# Patient Record
Sex: MALE
[De-identification: names, ages, dates, MRNs, and addresses within clinical notes are randomized per-mention and may not be internally consistent; named-entity substitution may affect disease eponyms.]

---

## 2017-04-03 ENCOUNTER — CHARTING TRANS (OUTPATIENT)
Dept: OTHER | Age: 27
End: 2017-04-03

## 2017-05-11 ENCOUNTER — CHARTING TRANS (OUTPATIENT)
Dept: OTHER | Age: 27
End: 2017-05-11

## 2018-11-23 ENCOUNTER — IMAGING SERVICES (OUTPATIENT)
Dept: OTHER | Age: 28
End: 2018-11-23

## 2019-01-24 ENCOUNTER — IMAGING SERVICES (OUTPATIENT)
Dept: OTHER | Age: 29
End: 2019-01-24

## 2023-12-30 PROBLEM — F11.90 OPIOID USE DISORDER: Status: ACTIVE | Noted: 2023-12-30

## 2023-12-30 PROBLEM — F14.10 COCAINE USE DISORDER (HCC): Status: ACTIVE | Noted: 2023-12-30

## 2023-12-30 PROBLEM — F32.A DEPRESSION: Status: ACTIVE | Noted: 2023-12-30

## 2024-01-04 PROBLEM — F33.9 MAJOR DEPRESSIVE DISORDER, RECURRENT, UNSPECIFIED (HCC): Status: ACTIVE | Noted: 2024-01-04

## 2024-01-05 PROBLEM — F15.90 STIMULANT USE DISORDER: Status: ACTIVE | Noted: 2023-12-30

## 2024-01-05 PROBLEM — F10.90 ALCOHOL USE DISORDER: Status: ACTIVE | Noted: 2024-01-05

## 2024-01-05 PROBLEM — F11.99 OPIOID USE, UNSPECIFIED WITH UNSPECIFIED OPIOID-INDUCED DISORDER (HCC): Status: ACTIVE | Noted: 2023-12-30

## 2024-01-05 PROBLEM — F29 UNSPECIFIED PSYCHOSIS NOT DUE TO A SUBSTANCE OR KNOWN PHYSIOLOGICAL CONDITION (HCC): Status: ACTIVE | Noted: 2024-01-05

## 2024-01-05 PROBLEM — F39 UNSPECIFIED MOOD (AFFECTIVE) DISORDER (HCC): Status: ACTIVE | Noted: 2023-12-30

## 2024-01-06 ENCOUNTER — HOSPITAL ENCOUNTER (INPATIENT)
Facility: HOSPITAL | Age: 34
End: 2024-01-06
Attending: EMERGENCY MEDICINE | Admitting: HOSPITALIST
Payer: MEDICAID

## 2024-01-06 ENCOUNTER — APPOINTMENT (OUTPATIENT)
Dept: CT IMAGING | Facility: HOSPITAL | Age: 34
End: 2024-01-06
Attending: EMERGENCY MEDICINE
Payer: MEDICAID

## 2024-01-06 ENCOUNTER — APPOINTMENT (OUTPATIENT)
Dept: GENERAL RADIOLOGY | Facility: HOSPITAL | Age: 34
End: 2024-01-06
Attending: HOSPITALIST
Payer: MEDICAID

## 2024-01-06 ENCOUNTER — HOSPITAL ENCOUNTER (INPATIENT)
Facility: HOSPITAL | Age: 34
LOS: 3 days | Discharge: HOME | End: 2024-01-09
Attending: EMERGENCY MEDICINE | Admitting: HOSPITALIST
Payer: MEDICAID

## 2024-01-06 DIAGNOSIS — F29 PSYCHOSIS, UNSPECIFIED PSYCHOSIS TYPE (HCC): ICD-10-CM

## 2024-01-06 DIAGNOSIS — R41.82 ALTERED MENTAL STATUS, UNSPECIFIED ALTERED MENTAL STATUS TYPE: ICD-10-CM

## 2024-01-06 PROBLEM — R45.851 SUICIDAL IDEATION: Status: ACTIVE | Noted: 2024-01-06

## 2024-01-06 PROBLEM — R44.3 HALLUCINATION: Status: ACTIVE | Noted: 2024-01-06

## 2024-01-06 LAB
ALBUMIN SERPL-MCNC: 3.9 G/DL (ref 3.4–5)
ALBUMIN/GLOB SERPL: 1 {RATIO} (ref 1–2)
ALP LIVER SERPL-CCNC: 108 U/L
ALT SERPL-CCNC: 198 U/L
AMMONIA PLAS-MCNC: 24 UMOL/L (ref 11–32)
ANION GAP SERPL CALC-SCNC: 4 MMOL/L (ref 0–18)
AST SERPL-CCNC: 73 U/L (ref 15–37)
BASOPHILS # BLD AUTO: 0.04 X10(3) UL (ref 0–0.2)
BASOPHILS NFR BLD AUTO: 0.5 %
BILIRUB SERPL-MCNC: 0.3 MG/DL (ref 0.1–2)
BUN BLD-MCNC: 20 MG/DL (ref 9–23)
CALCIUM BLD-MCNC: 9.4 MG/DL (ref 8.5–10.1)
CHLORIDE SERPL-SCNC: 101 MMOL/L (ref 98–112)
CO2 SERPL-SCNC: 33 MMOL/L (ref 21–32)
CREAT BLD-MCNC: 1.38 MG/DL
EGFRCR SERPLBLD CKD-EPI 2021: 69 ML/MIN/1.73M2 (ref 60–?)
EOSINOPHIL # BLD AUTO: 0.15 X10(3) UL (ref 0–0.7)
EOSINOPHIL NFR BLD AUTO: 1.9 %
ERYTHROCYTE [DISTWIDTH] IN BLOOD BY AUTOMATED COUNT: 13.3 %
ETHANOL SERPL-MCNC: <3 MG/DL (ref ?–3)
GLOBULIN PLAS-MCNC: 4.1 G/DL (ref 2.8–4.4)
GLUCOSE BLD-MCNC: 150 MG/DL (ref 70–99)
HCT VFR BLD AUTO: 39.6 %
HGB BLD-MCNC: 14.5 G/DL
IMM GRANULOCYTES # BLD AUTO: 0.02 X10(3) UL (ref 0–1)
IMM GRANULOCYTES NFR BLD: 0.2 %
LYMPHOCYTES # BLD AUTO: 1.6 X10(3) UL (ref 1–4)
LYMPHOCYTES NFR BLD AUTO: 19.9 %
MCH RBC QN AUTO: 28.5 PG (ref 26–34)
MCHC RBC AUTO-ENTMCNC: 36.6 G/DL (ref 31–37)
MCV RBC AUTO: 78 FL
MONOCYTES # BLD AUTO: 0.74 X10(3) UL (ref 0.1–1)
MONOCYTES NFR BLD AUTO: 9.2 %
NEUTROPHILS # BLD AUTO: 5.48 X10 (3) UL (ref 1.5–7.7)
NEUTROPHILS # BLD AUTO: 5.48 X10(3) UL (ref 1.5–7.7)
NEUTROPHILS NFR BLD AUTO: 68.3 %
OSMOLALITY SERPL CALC.SUM OF ELEC: 291 MOSM/KG (ref 275–295)
PLATELET # BLD AUTO: 178 10(3)UL (ref 150–450)
POTASSIUM SERPL-SCNC: 3.9 MMOL/L (ref 3.5–5.1)
PROT SERPL-MCNC: 8 G/DL (ref 6.4–8.2)
RBC # BLD AUTO: 5.08 X10(6)UL
SARS-COV-2 RNA RESP QL NAA+PROBE: NOT DETECTED
SODIUM SERPL-SCNC: 138 MMOL/L (ref 136–145)
WBC # BLD AUTO: 8 X10(3) UL (ref 4–11)

## 2024-01-06 PROCEDURE — 71045 X-RAY EXAM CHEST 1 VIEW: CPT | Performed by: HOSPITALIST

## 2024-01-06 PROCEDURE — 70450 CT HEAD/BRAIN W/O DYE: CPT | Performed by: EMERGENCY MEDICINE

## 2024-01-06 PROCEDURE — 99223 1ST HOSP IP/OBS HIGH 75: CPT | Performed by: HOSPITALIST

## 2024-01-06 RX ORDER — MULTIPLE VITAMINS W/ MINERALS TAB 9MG-400MCG
1 TAB ORAL DAILY
Status: DISCONTINUED | OUTPATIENT
Start: 2024-01-06 | End: 2024-01-09

## 2024-01-06 RX ORDER — ENOXAPARIN SODIUM 100 MG/ML
40 INJECTION SUBCUTANEOUS DAILY
Status: DISCONTINUED | OUTPATIENT
Start: 2024-01-06 | End: 2024-01-09

## 2024-01-06 RX ORDER — ONDANSETRON 2 MG/ML
4 INJECTION INTRAMUSCULAR; INTRAVENOUS EVERY 6 HOURS PRN
Status: DISCONTINUED | OUTPATIENT
Start: 2024-01-06 | End: 2024-01-08

## 2024-01-06 RX ORDER — HALOPERIDOL 5 MG/ML
2 INJECTION INTRAMUSCULAR EVERY 4 HOURS PRN
Status: DISCONTINUED | OUTPATIENT
Start: 2024-01-06 | End: 2024-01-09

## 2024-01-06 RX ORDER — NALOXONE HYDROCHLORIDE 1 MG/ML
2 INJECTION INTRAMUSCULAR; INTRAVENOUS; SUBCUTANEOUS ONCE
Status: COMPLETED | OUTPATIENT
Start: 2024-01-06 | End: 2024-01-06

## 2024-01-06 RX ORDER — THIAMINE HYDROCHLORIDE 100 MG/ML
100 INJECTION, SOLUTION INTRAMUSCULAR; INTRAVENOUS ONCE
Status: COMPLETED | OUTPATIENT
Start: 2024-01-06 | End: 2024-01-06

## 2024-01-06 RX ORDER — BUPRENORPHINE 2 MG/1
8 TABLET SUBLINGUAL 2 TIMES DAILY
COMMUNITY
End: 2024-01-09

## 2024-01-06 RX ORDER — LORAZEPAM 1 MG/1
2 TABLET ORAL
Status: DISCONTINUED | OUTPATIENT
Start: 2024-01-06 | End: 2024-01-08

## 2024-01-06 RX ORDER — MELATONIN
100 DAILY
Status: DISCONTINUED | OUTPATIENT
Start: 2024-01-07 | End: 2024-01-09

## 2024-01-06 RX ORDER — BENZONATATE 100 MG/1
200 CAPSULE ORAL 3 TIMES DAILY PRN
Status: DISCONTINUED | OUTPATIENT
Start: 2024-01-06 | End: 2024-01-09

## 2024-01-06 RX ORDER — ACETAMINOPHEN 500 MG
1000 TABLET ORAL EVERY 4 HOURS PRN
Status: DISCONTINUED | OUTPATIENT
Start: 2024-01-06 | End: 2024-01-07

## 2024-01-06 RX ORDER — PROCHLORPERAZINE EDISYLATE 5 MG/ML
5 INJECTION INTRAMUSCULAR; INTRAVENOUS EVERY 8 HOURS PRN
Status: DISCONTINUED | OUTPATIENT
Start: 2024-01-06 | End: 2024-01-09

## 2024-01-06 RX ORDER — SENNOSIDES 8.6 MG
17.2 TABLET ORAL NIGHTLY PRN
Status: DISCONTINUED | OUTPATIENT
Start: 2024-01-06 | End: 2024-01-09

## 2024-01-06 RX ORDER — LORAZEPAM 1 MG/1
1 TABLET ORAL
Status: DISCONTINUED | OUTPATIENT
Start: 2024-01-06 | End: 2024-01-08

## 2024-01-06 RX ORDER — FOLIC ACID 1 MG/1
1 TABLET ORAL DAILY
Status: DISCONTINUED | OUTPATIENT
Start: 2024-01-06 | End: 2024-01-09

## 2024-01-06 RX ORDER — ECHINACEA PURPUREA EXTRACT 125 MG
1 TABLET ORAL
Status: DISCONTINUED | OUTPATIENT
Start: 2024-01-06 | End: 2024-01-09

## 2024-01-06 RX ORDER — POLYETHYLENE GLYCOL 3350 17 G/17G
17 POWDER, FOR SOLUTION ORAL DAILY PRN
Status: DISCONTINUED | OUTPATIENT
Start: 2024-01-06 | End: 2024-01-09

## 2024-01-06 RX ORDER — MELATONIN
3 NIGHTLY PRN
Status: DISCONTINUED | OUTPATIENT
Start: 2024-01-06 | End: 2024-01-09

## 2024-01-06 RX ORDER — BISACODYL 10 MG
10 SUPPOSITORY, RECTAL RECTAL
Status: DISCONTINUED | OUTPATIENT
Start: 2024-01-06 | End: 2024-01-09

## 2024-01-06 RX ORDER — ENEMA 19; 7 G/133ML; G/133ML
1 ENEMA RECTAL ONCE AS NEEDED
Status: DISCONTINUED | OUTPATIENT
Start: 2024-01-06 | End: 2024-01-09

## 2024-01-06 NOTE — ED PROVIDER NOTES
Patient Seen in: [facility] Emergency Department      History     Chief Complaint   Patient presents with    Altered Mental Status     AMS     Stated Complaint: ams while at [facility]    Subjective:   HPI    Patient is a 33-year-old male who presents to emergency room for potential psychosis, DTs.  Patient is unable to find a reliable history.  Patient initially seen in ED on 12/29 for suicidal ideation.  History of heroin abuse.  Potential heroin withdrawal.  Was in the ED for 5 days and finally transferred to [facility] 2 days ago.  Spoke with [facility] worker at the bedside who stated patient was having bizarre behavior tonight.  He was going through his roommate stuff and was being agitated.  He was given medication including respite all at 8 PM.  Was given phenobarbital at 9 PM.  Given trazodone at 9:30 PM.  Given IM Zofran at 10:55 PM.  Given 2 mg of IM Ativan at 11 PM.  He currently presents extremely sedated unable to provide any reliable history.  Spoke with psychiatrist who is on staff at [facility], Dr. Solomon stated they were concerned for potential DTs there.  She states he has been acting psychotic since he has been there.  She states he has a history of seizures.  Patient apparently was hearing voices during psychiatrist assessment and talking to himself.    Objective:   Past Medical History:   Diagnosis Date    Asthma     Depression     Polysubstance abuse (HCC)               History reviewed. No pertinent surgical history.             Social History     Socioeconomic History    Marital status: Single   Tobacco Use    Smoking status: Every Day     Packs/day: 0.25     Years: 10.00     Additional pack years: 0.00     Total pack years: 2.50     Types: Cigarettes    Smokeless tobacco: Never   Vaping Use    Vaping Use: Some days    Substances: Nicotine   Substance and Sexual Activity    Alcohol use: Yes     Comment: 1 pint to 1 5th daily    Drug use: Yes     Types: \"Crack\" cocaine,  benzodiazepines, Cocaine, Heroin, methamphetamine, Opiods     Comment: see previous; daily use; 2 g Heroin daily              Review of Systems    Positive for stated complaint: ams while at [facility]  Other systems are as noted in HPI.  Constitutional and vital signs reviewed.      All other systems reviewed and negative except as noted above.    Physical Exam     ED Triage Vitals [01/06/24 0030]   /85   Pulse 111   Resp 18   Temp 98 °F (36.7 °C)   Temp src Temporal   SpO2 94 %   O2 Device None (Room air)       Current:/88   Pulse 108   Temp 98 °F (36.7 °C) (Temporal)   Resp 12   Wt 72 kg   SpO2 96%   BMI 25.62 kg/m²         Physical Exam    Constitutional: Patient is a gentleman who is obtunded currently.  He is breathing on his own.  HEENT: Head normal cephalic/atraumatic.  Pupils miotic.  Lungs: Clear  Cardiovascular: Regular rate and rhythm, S1-S2  Skin: Warm and dry  Neurological: Patient withdraws to pain in all extremities.  He is currently nonverbal    ED Course     Labs Reviewed   COMP METABOLIC PANEL (14) - Abnormal; Notable for the following components:       Result Value    Glucose 150 (*)     CO2 33.0 (*)     Creatinine 1.38 (*)     AST 73 (*)      (*)     All other components within normal limits   CBC W/ DIFFERENTIAL - Abnormal; Notable for the following components:    MCV 78.0 (*)     All other components within normal limits   ETHYL ALCOHOL - Normal   AMMONIA, PLASMA - Normal   CBC WITH DIFFERENTIAL WITH PLATELET    Narrative:     The following orders were created for panel order CBC With Differential With Platelet.  Procedure                               Abnormality         Status                     ---------                               -----------         ------                     CBC W/ DIFFERENTIAL[[id number]]          Abnormal            Final result                 Please view results for these tests on the individual orders.   DRUG SCREEN 7 W/OUT CONFIRMATION, URINE    RAINBOW DRAW LAVENDER   RAINBOW DRAW LIGHT GREEN   RAINBOW DRAW GOLD   RAPID SARS-COV-2 BY PCR   Bicarb 33.  AST 73.  .          I personally reviewd CT images of head and independent interpretation shows no acute bleed.  I also viewed formal radiology report as read by radiology with findings below:  CT head read by [facility] rad radiology as negative for acute bleed         MDM      Patient is a 33-year-old male presents to emergency room for psychosis.  Unable to find reliable history.  Differential includes DTs, psychosis, underlying psychiatric condition, withdrawal from illicit drugs.  Patient had laboratory test performed here showing elevated transaminases.  Creatinine elevated at 1.38.  Given IV fluids.  CT head negative for bleed.  Case was discussed with psychiatry, Dr. Solomon who is recommending admission to hospital medically at this time as they cannot care for patient at [facility].  Case was discussed with the hospitalist.  He is currently still sedated.  Hospitalist recommended behavioral telemetry floor with a sitter.      Independent source(s) of information include Dr. Solomon  Admission disposition: 1/6/2024  4:51 AM                                        Medical Decision Making      Disposition and Plan     Clinical Impression:  1. Altered mental status, unspecified altered mental status type    2. Psychosis, unspecified psychosis type (HCC)    Renal insufficiency.    Disposition:  Admit  1/6/2024  4:51 am    Follow-up:  No follow-up provider specified.        Medications Prescribed:  Current Discharge Medication List                            Hospital Problems       Present on Admission  Date Reviewed: 1/5/2024            ICD-10-CM Noted POA    * (Principal) Altered mental status, unspecified altered mental status type R41.82 1/6/2024 Unknown

## 2024-01-06 NOTE — H&P
[facility]     Gonzales Villafuerte Patient Status:  Emergency    1990 MRN [id number]   Location [facility] EMERGENCY DEPARTMENT Attending Dionisio Mabry MD   Hosp Day # 0 PCP None Pcp     Chief Complaint: agitation    Subjective:    History of Present Illness:     Gonzales Villafuerte is a 33 year old male with PMH polysubstance abuse including heroin, asthma, deppression who p/w agitation from [facility]. Pt had been in ED for several days this week awaiting [facility] admission for SI and heroin w/d. Was just transferred there 2 days ago. Today, pt was severely agitated and tremulous. Appeared to be having hallucinations. At 8 pm given risperdone and trazadone. At 9PM, given phenobarbital. At 11 PM, then given 2 mg Ativan. Pt arrived to ED for medical rule out w/ some concerns for DT's. Pt currently sleeping and completely sedated. Unable to give hx. Initially declined admission as suspect psych cause. ED reached out to psych who requested admission to r/o medical causes including DT's.     History/Other:    Past Medical History:  Past Medical History:   Diagnosis Date    Asthma     Depression     Polysubstance abuse (HCC)      Past Surgical History:   History reviewed. No pertinent surgical history.   Family History:   Family History   Problem Relation Age of Onset    No Known Problems Father     No Known Problems Mother     No Known Problems Maternal Grandmother     No Known Problems Maternal Grandfather     No Known Problems Paternal Grandmother     No Known Problems Paternal Grandfather      Social History:    reports that he has been smoking cigarettes. He has a 2.50 pack-year smoking history. He has never used smokeless tobacco. He reports current alcohol use. He reports current drug use. Drugs: \"Crack\" cocaine, benzodiazepines, Cocaine, Heroin, methamphetamine, and Opiods.     Allergies: No Known Allergies    Medications:    Current Facility-Administered Medications on File Prior to  Encounter   Medication Dose Route Frequency Provider Last Rate Last Admin    [COMPLETED] LORazepam (Ativan) 2 mg/mL injection 2 mg  2 mg Intramuscular Once Adali Solomon MD   2 mg at 24 2304    [] buprenorphine (SUBUTEX) SL tab 2 mg  2 mg Sublingual Q2H PRN Liset Santiago MD        Followed by    [] buprenorphine (SUBUTEX) SL tab 2 mg  2 mg Sublingual Q2H PRN Liset Santiago MD   2 mg at 24 0918    [COMPLETED] buprenorphine (SUBUTEX) SL tab 8 mg  8 mg Sublingual Once Dionisio Mercedes MD   8 mg at 24 0735    [COMPLETED] ondansetron (Zofran-ODT) disintegrating tab 4 mg  4 mg Oral Once Dionisio Mercedes MD   4 mg at 24 1303    [COMPLETED] acetaminophen (Tylenol Extra Strength) tab 1,000 mg  1,000 mg Oral Once Shefali Yang, DO   1,000 mg at 24 0027    [COMPLETED] buprenorphine (SUBUTEX) SL tab 8 mg  8 mg Sublingual Once Traci Nails MD   8 mg at 24 1523    [COMPLETED] buprenorphine (SUBUTEX) SL tab 8 mg  8 mg Sublingual Once HawiAstercy, DO   8 mg at 23 0745    [COMPLETED] acetaminophen (Tylenol Extra Strength) tab 1,000 mg  1,000 mg Oral Once Kesha Lindsay, DO   1,000 mg at 23 0745    [COMPLETED] ondansetron (Zofran-ODT) disintegrating tab 4 mg  4 mg Oral Once HawiAstercy, DO   4 mg at 23 1312    [COMPLETED] ibuprofen (Motrin) tab 400 mg  400 mg Oral Once Kesha Lindsay, DO   400 mg at 23 1312    [COMPLETED] buprenorphine (SUBUTEX) SL tab 8 mg  8 mg Sublingual Once Mikael Uriarte MD   8 mg at 23 2027    [COMPLETED] potassium chloride (K-Dur) tab 40 mEq  40 mEq Oral Once Johana Campos MD   40 mEq at 23 0124    [COMPLETED] acetaminophen (Tylenol Extra Strength) tab 1,000 mg  1,000 mg Oral Once Mikael Uriarte MD   1,000 mg at 23 1000    [COMPLETED] buprenorphine (SUBUTEX) SL tab 8 mg  8 mg Sublingual Once Mikael Uriarte MD   8 mg at 23 1300    [COMPLETED] buprenorphine (SUBUTEX) SL tab 2 mg  2 mg Sublingual  Once Johana Campos MD   2 mg at 12/29/23 2240     Current Outpatient Medications on File Prior to Encounter   Medication Sig Dispense Refill    busPIRone HCl 30 MG Oral Tab Take 1 tablet (30 mg total) by mouth Noon.      hydrOXYzine 50 MG Oral Tab Take 3 tablets (150 mg total) by mouth one time.      OLANZapine 20 MG Oral Tablet Dispersible Take 30 mg by mouth Noon.         Review of Systems:   A comprehensive review of systems was completed.    Pertinent positives and negatives noted in the HPI.    Objective:   Physical Exam:    /89   Pulse 114   Temp 98 °F (36.7 °C) (Temporal)   Resp 12   Wt 158 lb 11.7 oz (72 kg)   SpO2 97%   BMI 25.62 kg/m²   General: No acute distress, sedated  Respiratory: No rhonchi, no wheezes  Cardiovascular: S1, S2. Regular rate and rhythm  Abdomen: Soft, Non-tender, non-distended, positive bowel sounds  Neuro: No new focal deficits  Extremities: No edema      Results:    Labs:      Labs Last 24 Hours:    Recent Labs   Lab 01/06/24  0027   RBC 5.08   HGB 14.5   HCT 39.6   MCV 78.0*   MCH 28.5   MCHC 36.6   RDW 13.3   NEPRELIM 5.48   WBC 8.0   .0       Recent Labs   Lab 12/31/23  0755 01/06/24  0027   GLU  --  150*   BUN  --  20   CREATSERUM  --  1.38*   EGFRCR  --  69   CA  --  9.4   ALB  --  3.9   NA  --  138   K 4.3 3.9   CL  --  101   CO2  --  33.0*   ALKPHO  --  108   AST  --  73*   ALT  --  198*   BILT  --  0.3   TP  --  8.0       No results found for: \"PT\", \"INR\"    No results for input(s): \"TROP\", \"TROPHS\", \"CK\" in the last 168 hours.    No results for input(s): \"TROP\", \"PBNP\" in the last 168 hours.    No results for input(s): \"PCT\" in the last 168 hours.    Imaging: Imaging data reviewed in Epic.    Assessment & Plan:      #agitation, hallucination  -psych at [facility] contacted by ED and requested admission for medical r/o w/ concerns for DT's  -pt has been in ED here for a week, do not suspect DT's at this point  -labs and CTH unremarkalbe  -drug screen + for  cocaine, possible sneaking in drugs causing his sudden agitation?  -prn ativan if needed for agitation. Posey and restraints if needed when wakes up  -sitter  -check UA, CXR to r/o infection. If negative, would clear from a medical standpoint    #SI  -sitter  -psych    #asthma  #depression        Plan of care discussed with pt, ed physician     Jesse Ayala MD    Supplementary Documentation:     The 21st Century Cures Act makes medical notes like these available to patients in the interest of transparency. Please be advised this is a medical document. Medical documents are intended to carry relevant information, facts as evident, and the clinical opinion of the practitioner. The medical note is intended as peer to peer communication and may appear blunt or direct. It is written in medical language and may contain abbreviations or verbiage that are unfamiliar.

## 2024-01-06 NOTE — CONSULTS
I was asked to see in this young man with known psychosis and substance use disorder in consultation for suicide precautions.  This was over video.  The chaperone and nurse tried very hard to wake him up with verbal and light physical but he was continuously lethargic and mumbling.  Therefore I was unable to complete the consult as requested.  I will try to see him again tomorrow.  Based on chart review, it is unlikely that he is withdrawing from alcohol, of note.  He had been in the emergency room for nearly a week prior to [facility] transfer, therefore no access to alcohol or drugs.  I certainly recommend continuing as needed Haldol/Ativan for severe agitation or psychosis.

## 2024-01-06 NOTE — ED QUICK NOTES
Per NOC Charge RN and Primary NOC RN pt can go to ready room [id number]. This RN called security to make aware transport in and to expect call when transport arrives to travel with transport and pt to room

## 2024-01-06 NOTE — PROGRESS NOTES
One time CIWA ordered. Pt is lethargic only opens eyes for a few seconds then falls back to sleep. Pt mumbles, incomprehensible words. Does not answer questions, follows some commands. Sitter remains at bedside.

## 2024-01-07 PROBLEM — F31.9 AFFECTIVE PSYCHOSIS, BIPOLAR (HCC): Status: ACTIVE | Noted: 2023-12-30

## 2024-01-07 PROBLEM — F19.20 POLYSUBSTANCE (EXCLUDING OPIOIDS) DEPENDENCE (HCC): Status: ACTIVE | Noted: 2024-01-07

## 2024-01-07 LAB
ALBUMIN SERPL-MCNC: 3.4 G/DL (ref 3.4–5)
ALBUMIN/GLOB SERPL: 1.1 {RATIO} (ref 1–2)
ALP LIVER SERPL-CCNC: 62 U/L
ALT SERPL-CCNC: 125 U/L
AMPHET UR QL SCN: NEGATIVE
ANION GAP SERPL CALC-SCNC: 3 MMOL/L (ref 0–18)
AST SERPL-CCNC: 46 U/L (ref 15–37)
BASOPHILS # BLD AUTO: 0.04 X10(3) UL (ref 0–0.2)
BASOPHILS NFR BLD AUTO: 0.4 %
BENZODIAZ UR QL SCN: NEGATIVE
BILIRUB SERPL-MCNC: 0.6 MG/DL (ref 0.1–2)
BILIRUB UR QL STRIP.AUTO: NEGATIVE
BUN BLD-MCNC: 20 MG/DL (ref 9–23)
CALCIUM BLD-MCNC: 8.6 MG/DL (ref 8.5–10.1)
CANNABINOIDS UR QL SCN: NEGATIVE
CHLORIDE SERPL-SCNC: 105 MMOL/L (ref 98–112)
CLARITY UR REFRACT.AUTO: CLEAR
CO2 SERPL-SCNC: 29 MMOL/L (ref 21–32)
COCAINE UR QL: NEGATIVE
COLOR UR AUTO: YELLOW
CREAT BLD-MCNC: 1.09 MG/DL
CREAT UR-SCNC: 238 MG/DL
EGFRCR SERPLBLD CKD-EPI 2021: 92 ML/MIN/1.73M2 (ref 60–?)
EOSINOPHIL # BLD AUTO: 0.24 X10(3) UL (ref 0–0.7)
EOSINOPHIL NFR BLD AUTO: 2.2 %
ERYTHROCYTE [DISTWIDTH] IN BLOOD BY AUTOMATED COUNT: 13.5 %
GLOBULIN PLAS-MCNC: 3.1 G/DL (ref 2.8–4.4)
GLUCOSE BLD-MCNC: 95 MG/DL (ref 70–99)
GLUCOSE UR STRIP.AUTO-MCNC: NORMAL MG/DL
HCT VFR BLD AUTO: 34.3 %
HGB BLD-MCNC: 12.1 G/DL
IMM GRANULOCYTES # BLD AUTO: 0.02 X10(3) UL (ref 0–1)
IMM GRANULOCYTES NFR BLD: 0.2 %
KETONES UR STRIP.AUTO-MCNC: NEGATIVE MG/DL
LEUKOCYTE ESTERASE UR QL STRIP.AUTO: NEGATIVE
LYMPHOCYTES # BLD AUTO: 3.05 X10(3) UL (ref 1–4)
LYMPHOCYTES NFR BLD AUTO: 27.3 %
MCH RBC QN AUTO: 27.9 PG (ref 26–34)
MCHC RBC AUTO-ENTMCNC: 35.3 G/DL (ref 31–37)
MCV RBC AUTO: 79.2 FL
MDMA UR QL SCN: NEGATIVE
MONOCYTES # BLD AUTO: 0.88 X10(3) UL (ref 0.1–1)
MONOCYTES NFR BLD AUTO: 7.9 %
NEUTROPHILS # BLD AUTO: 6.93 X10 (3) UL (ref 1.5–7.7)
NEUTROPHILS # BLD AUTO: 6.93 X10(3) UL (ref 1.5–7.7)
NEUTROPHILS NFR BLD AUTO: 62 %
NITRITE UR QL STRIP.AUTO: NEGATIVE
OPIATES UR QL SCN: NEGATIVE
OSMOLALITY SERPL CALC.SUM OF ELEC: 286 MOSM/KG (ref 275–295)
OXYCODONE UR QL SCN: NEGATIVE
PH UR STRIP.AUTO: 6.5 [PH] (ref 5–8)
PLATELET # BLD AUTO: 170 10(3)UL (ref 150–450)
POTASSIUM SERPL-SCNC: 3.7 MMOL/L (ref 3.5–5.1)
PROT SERPL-MCNC: 6.5 G/DL (ref 6.4–8.2)
PROT UR STRIP.AUTO-MCNC: NEGATIVE MG/DL
RBC # BLD AUTO: 4.33 X10(6)UL
RBC UR QL AUTO: NEGATIVE
SODIUM SERPL-SCNC: 137 MMOL/L (ref 136–145)
SP GR UR STRIP.AUTO: 1.03 (ref 1–1.03)
UROBILINOGEN UR STRIP.AUTO-MCNC: NORMAL MG/DL
WBC # BLD AUTO: 11.2 X10(3) UL (ref 4–11)

## 2024-01-07 PROCEDURE — 99231 SBSQ HOSP IP/OBS SF/LOW 25: CPT | Performed by: HOSPITALIST

## 2024-01-07 PROCEDURE — 90792 PSYCH DIAG EVAL W/MED SRVCS: CPT | Performed by: OTHER

## 2024-01-07 RX ORDER — HYDROXYZINE HYDROCHLORIDE 25 MG/1
50 TABLET, FILM COATED ORAL NIGHTLY
Status: DISCONTINUED | OUTPATIENT
Start: 2024-01-07 | End: 2024-01-08

## 2024-01-07 RX ORDER — ACETAMINOPHEN 500 MG
1000 TABLET ORAL EVERY 4 HOURS PRN
Status: DISCONTINUED | OUTPATIENT
Start: 2024-01-07 | End: 2024-01-09

## 2024-01-07 RX ORDER — CARBAMAZEPINE 200 MG/1
200 TABLET, EXTENDED RELEASE ORAL 2 TIMES DAILY
Status: DISCONTINUED | OUTPATIENT
Start: 2024-01-07 | End: 2024-01-09

## 2024-01-07 NOTE — PROGRESS NOTES
[facility]     Hospitalist Progress Note     Gonzales Villafuerte Patient Status:  Inpatient    1990 MRN [id number]   [facility] 3NE-A Attending Mikael Nash MD   Hosp Day # 1 PCP None Pcp     Chief Complaint:   Chief Complaint   Patient presents with    Altered Mental Status     AMS       Subjective:     Patient denies any complaints except fatigue.    Objective:    Review of Systems:   5 point ROS completed and was negative, except for pertinent positive and negatives stated in subjective.    Vital signs:  Temp:  [98 °F (36.7 °C)-98.1 °F (36.7 °C)] 98 °F (36.7 °C)  Pulse:  [118-121] 121  Resp:  [18-27] 27  BP: (130-138)/(72-99) 138/99  SpO2:  [95 %] 95 %    Physical Exam:    General: No acute distress.  Just groggy.  Respiratory: Clear to auscultation bilaterally. No wheezes. No rhonchi.  Cardiovascular: S1, S2. Regular rate and rhythm. No murmurs.  Abdomen: Soft, nontender, nondistended.    Extremities: No edema.    Diagnostic Data:    Labs:  Recent Labs   Lab 24  0027 24  0836   WBC 8.0 11.2*   HGB 14.5 12.1*   MCV 78.0* 79.2*   .0 170.0       Recent Labs   Lab 24  0027 24  0836   * 95   BUN 20 20   CREATSERUM 1.38* 1.09   CA 9.4 8.6   ALB 3.9 3.4    137   K 3.9 3.7    105   CO2 33.0* 29.0   ALKPHO 108 62   AST 73* 46*   * 125*   BILT 0.3 0.6   TP 8.0 6.5       Estimated Creatinine Clearance: 87 mL/min (based on SCr of 1.09 mg/dL).    No results for input(s): \"PTP\", \"INR\" in the last 168 hours.         COVID-19 Lab Results    COVID-19  Lab Results   Component Value Date    COVID19 Not Detected 2024    COVID19 Not Detected 2023       Pro-Calcitonin  No results for input(s): \"PCT\" in the last 168 hours.    Cardiac  No results for input(s): \"TROP\", \"PBNP\" in the last 168 hours.    Creatinine Kinase  No results for input(s): \"CK\" in the last 168 hours.    Inflammatory Markers  No results for input(s): \"CRP\", \"MAAME\", \"LDH\",  \"DDIMER\" in the last 168 hours.    No results for input(s): \"TROP\", \"TROPHS\", \"CK\" in the last 168 hours.    Imaging: Imaging data reviewed in Epic.    Medications:    carBAMazepine ER  200 mg Oral BID    hydrOXYzine  50 mg Oral Nightly    enoxaparin  40 mg Subcutaneous Daily    thiamine  100 mg Oral Daily    multivitamin with minerals  1 tablet Oral Daily    folic acid  1 mg Oral Daily       Assessment & Plan:      #agitation, hallucination; delirium; multifactorial from drug/etoh/bipolar illness with psychosis - per psych regarding psych meds; patient says he no longer takes buprenorphine routinely; did not want a dose at time of my visit on 1/7    #SI  -sitter  -psych     #asthma  #depression      Plan of care discussed with patient and RN    Mikael Gurrola MD    Supplementary Documentation:     Quality:  DVT Prophylaxis: lovenox  CODE status: full  Romero: no  Central line: no      Estimated date of discharge: 1 day  At this point Mr. Villafuerte is expected to be discharge to: [facility] [[email or web address]]

## 2024-01-07 NOTE — PLAN OF CARE
Assumed care at 1930. Lethargic, unable to assess orientation status, incomprehensible speech. RA, VSS, ST on tele. Non cardiac electrolyte replacement protocol. Lovenox for VTE. PIV in R AC flushed and saline locked. Dressing CDI. Regular diet. No signs of pain. 1:1 sitter for SI / elopement. Psych to see in AM.     Problem: Delirium  Goal: Minimize duration of delirium  Description: Interventions:  - Encourage use of hearing aids, eye glasses  - Promote highest level of mobility daily  - Provide frequent reorientation  - Promote wakefulness i.e. lights on, blinds open  - Promote sleep, encourage patient's normal rest cycle i.e. lights off, TV off, minimize noise and interruptions  - Encourage family to assist in orientation and promotion of home routines  Outcome: Progressing

## 2024-01-07 NOTE — PLAN OF CARE
Assumed patient care at 730. Lethargic. A/Ox3-4, depends on when you ask him. Pt has been sleeping majority of day. Pt only woke up for breakfast and dinner. 1:1 sitter, SI precautions. Room air. Sinus tach on tele. Lovenox vte. Electrolyte protocol (noncardiac). Regular diet. Plan is for discharge home today. Ambulates independent. Right ac saline locked. All safety precautions are in place and will continue with plan of care.    Problem: Delirium  Goal: Minimize duration of delirium  Description: Interventions:  - Encourage use of hearing aids, eye glasses  - Promote highest level of mobility daily  - Provide frequent reorientation  - Promote wakefulness i.e. lights on, blinds open  - Promote sleep, encourage patient's normal rest cycle i.e. lights off, TV off, minimize noise and interruptions  - Encourage family to assist in orientation and promotion of home routines  Outcome: Progressing

## 2024-01-08 PROBLEM — G93.40 ACUTE ENCEPHALOPATHY: Status: ACTIVE | Noted: 2024-01-08

## 2024-01-08 PROBLEM — F19.90 POLYSUBSTANCE USE DISORDER: Status: ACTIVE | Noted: 2024-01-08

## 2024-01-08 PROCEDURE — 99233 SBSQ HOSP IP/OBS HIGH 50: CPT | Performed by: OTHER

## 2024-01-08 PROCEDURE — 99231 SBSQ HOSP IP/OBS SF/LOW 25: CPT | Performed by: HOSPITALIST

## 2024-01-08 RX ORDER — CARBAMAZEPINE 200 MG/1
200 TABLET, EXTENDED RELEASE ORAL 2 TIMES DAILY
Qty: 60 TABLET | Refills: 0 | Status: SHIPPED | OUTPATIENT
Start: 2024-01-08 | End: 2024-01-09

## 2024-01-08 RX ORDER — LOPERAMIDE HYDROCHLORIDE 2 MG/1
2 CAPSULE ORAL EVERY 4 HOURS PRN
Status: DISCONTINUED | OUTPATIENT
Start: 2024-01-08 | End: 2024-01-09

## 2024-01-08 RX ORDER — TRAZODONE HYDROCHLORIDE 50 MG/1
50 TABLET ORAL NIGHTLY PRN
Status: DISCONTINUED | OUTPATIENT
Start: 2024-01-08 | End: 2024-01-09

## 2024-01-08 RX ORDER — ONDANSETRON 4 MG/1
4 TABLET, ORALLY DISINTEGRATING ORAL EVERY 6 HOURS PRN
Status: DISCONTINUED | OUTPATIENT
Start: 2024-01-08 | End: 2024-01-09

## 2024-01-08 RX ORDER — HYDROXYZINE HYDROCHLORIDE 25 MG/1
100 TABLET, FILM COATED ORAL NIGHTLY PRN
Status: DISCONTINUED | OUTPATIENT
Start: 2024-01-08 | End: 2024-01-09

## 2024-01-08 RX ORDER — BUPRENORPHINE 2 MG/1
2 TABLET SUBLINGUAL EVERY 8 HOURS PRN
Status: DISCONTINUED | OUTPATIENT
Start: 2024-01-12 | End: 2024-01-08

## 2024-01-08 RX ORDER — BUPRENORPHINE 2 MG/1
4 TABLET SUBLINGUAL AS NEEDED
Status: DISCONTINUED | OUTPATIENT
Start: 2024-01-08 | End: 2024-01-08

## 2024-01-08 RX ORDER — BUPRENORPHINE 2 MG/1
2 TABLET SUBLINGUAL ONCE
Status: COMPLETED | OUTPATIENT
Start: 2024-01-08 | End: 2024-01-08

## 2024-01-08 RX ORDER — BUPRENORPHINE 2 MG/1
2 TABLET SUBLINGUAL EVERY 2 HOUR PRN
Status: DISCONTINUED | OUTPATIENT
Start: 2024-01-08 | End: 2024-01-08

## 2024-01-08 RX ORDER — BUPRENORPHINE 2 MG/1
2 TABLET SUBLINGUAL EVERY 6 HOURS PRN
Status: DISCONTINUED | OUTPATIENT
Start: 2024-01-11 | End: 2024-01-08

## 2024-01-08 RX ORDER — HYDROXYZINE HYDROCHLORIDE 25 MG/1
50 TABLET, FILM COATED ORAL NIGHTLY PRN
Status: DISCONTINUED | OUTPATIENT
Start: 2024-01-08 | End: 2024-01-09

## 2024-01-08 RX ORDER — HYDROXYZINE 50 MG/1
TABLET, FILM COATED ORAL
Qty: 60 TABLET | Refills: 0 | Status: SHIPPED | OUTPATIENT
Start: 2024-01-08 | End: 2024-01-09

## 2024-01-08 RX ORDER — BUPRENORPHINE 2 MG/1
2 TABLET SUBLINGUAL EVERY 4 HOURS PRN
Status: DISCONTINUED | OUTPATIENT
Start: 2024-01-10 | End: 2024-01-08

## 2024-01-08 RX ORDER — DICYCLOMINE HCL 20 MG
20 TABLET ORAL EVERY 4 HOURS PRN
Status: DISCONTINUED | OUTPATIENT
Start: 2024-01-08 | End: 2024-01-09

## 2024-01-08 RX ORDER — HYDROXYZINE HYDROCHLORIDE 25 MG/1
50 TABLET, FILM COATED ORAL 2 TIMES DAILY PRN
Status: DISCONTINUED | OUTPATIENT
Start: 2024-01-08 | End: 2024-01-09

## 2024-01-08 RX ORDER — ONDANSETRON 2 MG/ML
4 INJECTION INTRAMUSCULAR; INTRAVENOUS EVERY 6 HOURS PRN
Status: DISCONTINUED | OUTPATIENT
Start: 2024-01-08 | End: 2024-01-09

## 2024-01-08 RX ORDER — BUPRENORPHINE 2 MG/1
2 TABLET SUBLINGUAL EVERY 2 HOUR PRN
Status: DISCONTINUED | OUTPATIENT
Start: 2024-01-09 | End: 2024-01-08

## 2024-01-08 NOTE — PLAN OF CARE
Assumed patient care at 730. A/Ox4, depends on when you ask him. 1:1 sitter, SI precautions discontinued. Room air. Sinus tach on tele. Lovenox vte. Electrolyte protocol (noncardiac). Regular diet. Plan is for discharge to  treatment. Ambulates independent. Right ac saline locked. All safety precautions are in place and will continue with plan of care.     Problem: Delirium  Goal: Minimize duration of delirium  Description: Interventions:  - Encourage use of hearing aids, eye glasses  - Promote highest level of mobility daily  - Provide frequent reorientation  - Promote wakefulness i.e. lights on, blinds open  - Promote sleep, encourage patient's normal rest cycle i.e. lights off, TV off, minimize noise and interruptions  - Encourage family to assist in orientation and promotion of home routines  Outcome: Progressing

## 2024-01-08 NOTE — DISCHARGE SUMMARY
[place] HOSPITALIST  DISCHARGE SUMMARY     Gonzales Villafuerte Patient Status:  Inpatient    1990 MRN [id number]   Location [facility] 3NE-A Attending Mikael Nash MD   Hosp Day # 2 PCP None Pcp     Date of Admission: 2024  Date of Discharge: ***2024  Discharge Disposition: [facility]***  Chief complaint:   Chief Complaint   Patient presents with    Altered Mental Status     AMS     Discharge Diagnosis:   #agitation, hallucination; delirium; multifactorial from drug/etoh/bipolar illness with psychosis   #hx opiate use disorder?   #SI  #asthma  #depression    Brief Synopsis: admitted for above, and improved with conservative trx.  Had iv fluids but did not required CIWA meds once on 3CTU; psych meds managed during stay by psych service.  Transferred back to [facility]  after medical clearance.    Lab/Test results pending at Discharge:   none        Admission History of Present Illness (author of HPI not necessarily myself; see H&P author): Gonzales Villafuerte is a 33 year old male with PMH polysubstance abuse including heroin, asthma, deppression who p/w agitation from [facility]. Pt had been in ED for several days this week awaiting [facility] admission for SI and heroin w/d. Was just transferred there 2 days ago. Today, pt was severely agitated and tremulous. Appeared to be having hallucinations. At 8 pm given risperdone and trazadone. At 9PM, given phenobarbital. At 11 PM, then given 2 mg Ativan. Pt arrived to ED for medical rule out w/ some concerns for DT's. Pt currently sleeping and completely sedated. Unable to give hx. Initially declined admission as suspect psych cause. ED reached out to psych who requested admission to r/o medical causes including DT's.        Lace+ Score: 30  59-90 High Risk  29-58 Medium Risk  0-28   Low Risk  Patient was referred to the [facility].    TCM Follow-Up Recommendation:  LACE 29-58: Moderate Risk of readmission after discharge from the hospital.      Discharge  Medication List:     Discharge Medications        ASK your doctor about these medications        Instructions Prescription details   buprenorphine 2 MG Subl  Commonly known as: SUBUTEX      Place 4 tablets (8 mg total) under the tongue in the morning and 4 tablets (8 mg total) before bedtime.   Refills: 0     busPIRone HCl 30 MG Tabs  Commonly known as: BUSPAR      Take 1 tablet (30 mg total) by mouth Noon.   Refills: 0     hydrOXYzine 50 MG Tabs  Commonly known as: Atarax      Take 3 tablets (150 mg total) by mouth one time.   Refills: 0     OLANZapine 20 MG Tbdp  Commonly known as: ZYPREXA      Take 30 mg by mouth Noon.   Refills: 0              [place]    Follow-up appointment:   [facility] [address] [place] [id number] [phone]  Follow up in 3 day(s)      Appointments for Next 30 Days 1/8/2024 - 2/7/2024      None            Vital signs:  Temp:  [98.1 °F (36.7 °C)-98.4 °F (36.9 °C)] 98.4 °F (36.9 °C)  Pulse:  [] 77  Resp:  [18-19] 19  BP: (103-121)/(58-69) 107/67  SpO2:  [90 %-97 %] 95 %    Physical Exam:    General: No acute distress.   Respiratory: Clear to auscultation bilaterally. No wheezes. No rhonchi.  Cardiovascular: S1, S2. Regular rate and rhythm. No murmurs, rubs or gallops.   Abdomen: Soft, nontender, nondistended.   Neurologic: No focal neurological deficits.   Musculoskeletal: Moves all extremities.  Extremities: No edema.  -----------------------------------------------------------------------------------------------  PATIENT DISCHARGE INSTRUCTIONS: See electronic chart    Mikael Gurrola MD    Time spent:  > 30 minutes     edema.  -----------------------------------------------------------------------------------------------  PATIENT DISCHARGE INSTRUCTIONS: See electronic chart    Mikael Gurrola MD    Time spent:  > 30 minutes

## 2024-01-08 NOTE — PLAN OF CARE
Assumed care at 1930. Lethargic, but when awake A&O X4. Awake this PM to eat dinner and take meds. RA, VS, ST on tele. Non cardiac electrolyte replacement protocol. Lovenox for VTE. PIV in R AC flushed and saline locked. Dressing CDI, wrapped. Regular diet, appetite improving. Denies pain. 1:1 sitter for SI / elopement. Possible discharge tomorrow.     2315: pt c/o generalized pain and body aches. PRN tylenol given- see MAR.     Problem: Delirium  Goal: Minimize duration of delirium  Description: Interventions:  - Encourage use of hearing aids, eye glasses  - Promote highest level of mobility daily  - Provide frequent reorientation  - Promote wakefulness i.e. lights on, blinds open  - Promote sleep, encourage patient's normal rest cycle i.e. lights off, TV off, minimize noise and interruptions  - Encourage family to assist in orientation and promotion of home routines  Outcome: Progressing

## 2024-01-08 NOTE — OCCUPATIONAL THERAPY NOTE
Discussed with PT. Pt is independent with no concerns of ADLs and functional mobility. OT will sign off.

## 2024-01-08 NOTE — PROGRESS NOTES
Psych Liaison placed referrals for psychotherapy and psychiatry in-network with pt's pending Medicaid in discharge summary.

## 2024-01-08 NOTE — PROGRESS NOTES
Patient seen and examined.  Discharge today to [facility] if bed open, as he is medically cleared.  Hospitalist portion of med rec completed.  Will give now dose of subutex for mild/moderate opiate withdrawal.    Mikael Nash MD  [facility]  Internal Medicine Hospitalist

## 2024-01-08 NOTE — PROGRESS NOTES
[facility]  Report of Psychiatric Progress Note    Gonzales Villafuerte Patient Status:  Inpatient    1990 MRN [id number]   Location [facility] 3NE-A Attending Mikael Nash MD   Hosp Day # 2 PCP None Pcp     Date of Admission: 24  Date of Service: 24  Reason for Consultation:     Impression:  Primary Psychiatric Diagnosis:  Acute delirium/encephalopathy due to meds (Ativan 2mg x 5, Phenobarb 32.4mg, Risperdal 4mg, Subutex 2mg x 2, Trazodone 50mg x 3 from 23 to 23). RESOLVED. Alert and attentive and organized in thinking now.    Psychosis unspecified with auditory hallucinations when he abuses substances. RESOLVED.     Opioid withdrawal syndrome- DONE. It has been 10 days since his last exposure.     Polysubstance use disorder. He recalls using cocaine and heroin and crystal meth before he presented to ED on 24. Extensive hx of using LSD, psilocybin, MDMA, and ketamine in the past.     Mood disorder unspecified. NO hopelessness or suicidal ideation at this time. Rule out MDD. Rule out Bipolar disorder. Rule out schizophrenia. Rule out PTSD.     Pertinent Medical Diagnoses:  Hx asthma.    Recommendations:  1) DC suicide precautions and 1:1 sitter.    2) DC Subutex PRN. He is done with opioid withdrawal.     3) Continue Tegretol XL 200mg po twice a day to help with mood.    4) Change Atarax to 50mg po twice a day PRN anxiety and 50mg-100mg nightly PRN insomnia.     5) Will get Tegretol and Atarax filled at the [place] pharmacy so he has access to the meds.    6) He is homeless at this time because he doesn't have the money for transportation back to [place] where he lives with his mother. He is motivated to stay clean from substances at this time and open to going to a 30 day residential CD program. If he is unable to get into a program, he will go to a shelter. He is also reaching out to a friend in [place] for help in regards to money and temporary housing.     7) Recommend staying in  the hospital tonight so he can go straight to a residential CD program (hopefully).     Ranjit Joaquin MD  1/8/2024  2:13 PM    Subjective:  1/8/24- He feels tired and has mild anxiety. He denies depressed mood, hopeless or worthless feelings. He has NO suicidal ideation or homicidal ideation. He denies any voices/visions.     He shared how he lives in [place] with his mother. [place] was [place] court in order to resolve a past legal issue. He doesn't have the money for a plane or train ticket back to [place]. He is trying to reach a friend who lives in [place] for help in regards to money and temporary housing. He is open to a 30 day residential CD program for help in staying abstinent from substances. 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 He was recently using heroin and cocaine and meth in Dec 2023.     Psychiatry Review Systems: No elevated mood, racing thoughts, decreased need for sleep, grandiose thoughts.    Past Medical History:   Diagnosis Date    Asthma     Depression     Polysubstance abuse (HCC)      History reviewed. No pertinent surgical history.  Family History   Problem Relation Age of Onset    No Known Problems Father     No Known Problems Mother     No Known Problems Maternal Grandmother     No Known Problems Maternal Grandfather     No Known Problems Paternal Grandmother     No Known Problems Paternal Grandfather       reports that he has been smoking cigarettes. He has a 2.50 pack-year smoking history. He has never used smokeless tobacco. He reports current alcohol use. He reports current drug use. Drugs: \"Crack\" cocaine, benzodiazepines, Cocaine, Heroin, methamphetamine, and Opiods.    Allergies:  No Known Allergies    Medications:    Current Facility-Administered Medications:     loperamide (Imodium) cap 2 mg, 2 mg, Oral, Q4H PRN    ondansetron (Zofran-ODT) disintegrating tab 4 mg, 4 mg, Oral, Q6H PRN **OR** ondansetron  (Zofran) 4 MG/2ML injection 4 mg, 4 mg, Intramuscular, Q6H PRN    traZODone (Desyrel) tab 50 mg, 50 mg, Oral, Nightly PRN    dicyclomine (Bentyl) tab 20 mg, 20 mg, Oral, Q4H PRN    carBAMazepine ER (TEGRETOL XR) 12 hr tab 200 mg, 200 mg, Oral, BID    hydrOXYzine (Atarax) tab 50 mg, 50 mg, Oral, Nightly    acetaminophen (Tylenol Extra Strength) tab 1,000 mg, 1,000 mg, Oral, Q4H PRN    melatonin tab 3 mg, 3 mg, Oral, Nightly PRN    polyethylene glycol (PEG 3350) (Miralax) 17 g oral packet 17 g, 17 g, Oral, Daily PRN    sennosides (Senokot) tab 17.2 mg, 17.2 mg, Oral, Nightly PRN    bisacodyl (Dulcolax) 10 MG rectal suppository 10 mg, 10 mg, Rectal, Daily PRN    fleet enema (Fleet) 7-19 GM/118ML rectal enema 133 mL, 1 enema, Rectal, Once PRN    benzonatate (Tessalon) cap 200 mg, 200 mg, Oral, TID PRN    enoxaparin (Lovenox) 40 MG/0.4ML SUBQ injection 40 mg, 40 mg, Subcutaneous, Daily    prochlorperazine (Compazine) 10 MG/2ML injection 5 mg, 5 mg, Intravenous, Q8H PRN    sodium chloride (Saline Mist) 0.65 % nasal solution 1 spray, 1 spray, Each Nare, Q3H PRN    glycerin-hypromellose- (Artifical Tears) 0.2-0.2-1 % ophthalmic solution 1 drop, 1 drop, Both Eyes, QID PRN    haloperidol lactate (Haldol) 5 MG/ML injection 2 mg, 2 mg, Intravenous, Q4H PRN    thiamine (Vitamin B1) tab 100 mg, 100 mg, Oral, Daily    multivitamin with minerals (Thera M Plus) tab 1 tablet, 1 tablet, Oral, Daily    folic acid (Folvite) tab 1 mg, 1 mg, Oral, Daily    Review of Systems   Constitutional:  Positive for malaise/fatigue.   Psychiatric/Behavioral:  Positive for substance abuse. Negative for depression, hallucinations and suicidal ideas. The patient is nervous/anxious.      Mental Status Exam:     Objective      Vitals:    01/08/24 1253   BP: 114/72   Pulse:    Resp: 18   Temp: 98.2 °F (36.8 °C)     Appearance: normal grooming  Behavior: normal psychomotor  Attitude: cooperative and consistent  Gait: Normal    Speech: normal rate,  rhythm and volume    Mood: anxious  Affect: Congruent    Thought process: logical and sequential  Thought content: no delusions  Perceptions: no hallucinations  Associations: Intact    Orientation: oriented person, place and oriented situation  Attention and Concentration: focused and attentive  Memory:  intact recent and intact remote  Language: Intact naming and repetition  Fund of Knowledge: Able to recite name of US president    Insight: fair at this time  Judgment: fair at this time

## 2024-01-08 NOTE — DISCHARGE INSTRUCTIONS
Patient is scheduled for assessment to be admitted to [facility]  His appointment is Wednesday 1/10/24 at 8:00 am.    Psychiatrist In-Network with Medicaid (Accepting New Patients)    Dorina Pollard MD  [facility]   [address], [place] [address]  [phone]    Mary Roberson MD  [facility]  [address] [place] [address]  [phone]    Kunal Neville MD  [facility]  [address], [place] [address]  [phone]    MD Abhinav Callejas III, MD  [facility]   [address] [place] [address]  [phone]    Wilian Callahan MD  [facility]  [address], [place] [address]  [phone]    Vanessa Glaser MD  [facility]  [address] [place], [place] [id number] [phone]    Therapy Providers in-network with Medicaid     [facility]  [address] [place] [address]  [phone]    [facility]   [address] [id number] [phone]    [facility]  [address], [place] [id number] [phone]    [facility]  [address] [place] [address] [place] [address]  [phone]   [id number]  #[phone]    [address]  # [phone]    [facility] ( 2 locations)  1) [address] [place] [address]       # [phone]  2) [address], [place] [address]       # [phone]    General Medical Follow up:  [facility] (healthcare clinic) [email or web address]  A welcomes patients with Medicaid, Medicare, private insurance or no insurance at all, possibly at a discounted rate. [facility] offers low cost prescriptions drugs when seen by a [facility] physician.   [facility] [address] [place] [address] [phone]  [facility] [address], [place] [address] [phone]  [facility] [address] [place] [address] [phone]  [facility] [address], [address] [place] [address]  Kareem pang manju aqui o llame al teléfono [phone] o al [phone].     Visit [employer] for discounted prescription drug coupons or [employer] for $4 prescriptions [email or web address]    [facility] (only apply if you do not Qualify for medicaid)  [facility]  [address] [place] [address]; [address]  [place]anta [place]  Phone: [phone]  [email or web address]      [facility] [address] [id number] [phone]  [email or web address]

## 2024-01-08 NOTE — PROGRESS NOTES
Patient Spoke with [place]  Calin Langston to discuss possible treatment options. Patient agreed to set up a appointment for admission to [facility] in [place]. Patient appointment has been scheduled for Wednesday 1/10/24 at 8:00 am. [facility] located at [address] [place] [address]. [facility], [address]. Phone [phone].

## 2024-01-08 NOTE — PHYSICAL THERAPY NOTE
Order received, chart reviewed. Per chart review, and communication with RN, pt independent with no concerns regarding functional mobility. Will sign off.      Sheba Rodriguez, PT  01/08/24

## 2024-01-09 VITALS
DIASTOLIC BLOOD PRESSURE: 67 MMHG | OXYGEN SATURATION: 98 % | RESPIRATION RATE: 20 BRPM | HEART RATE: 83 BPM | BODY MASS INDEX: 26 KG/M2 | WEIGHT: 158.75 LBS | SYSTOLIC BLOOD PRESSURE: 104 MMHG | TEMPERATURE: 98 F

## 2024-01-09 PROCEDURE — 99238 HOSP IP/OBS DSCHRG MGMT 30/<: CPT | Performed by: HOSPITALIST

## 2024-01-09 PROCEDURE — 99233 SBSQ HOSP IP/OBS HIGH 50: CPT | Performed by: OTHER

## 2024-01-09 RX ORDER — CARBAMAZEPINE 200 MG/1
200 TABLET, EXTENDED RELEASE ORAL 2 TIMES DAILY
Qty: 60 TABLET | Refills: 0 | Status: SHIPPED | OUTPATIENT
Start: 2024-01-09

## 2024-01-09 RX ORDER — HYDROXYZINE 50 MG/1
TABLET, FILM COATED ORAL
Qty: 60 TABLET | Refills: 0 | Status: SHIPPED | OUTPATIENT
Start: 2024-01-09

## 2024-01-09 NOTE — PROGRESS NOTES
[facility]  Report of Psychiatric Progress Note    Gonzales Villafuerte Patient Status:  Inpatient    1990 MRN [id number]   Location [facility] 3NE-A Attending Mikael Nash MD   Hosp Day # 3 PCP None Pcp     Date of Admission: 24  Date of Service: 24  Reason for Consultation: suicidal ideation and altered mental status    Impression:  Primary Psychiatric Diagnosis:  Acute delirium/encephalopathy due to meds (Ativan 2mg x 5, Phenobarb 32.4mg, Risperdal 4mg, Subutex 2mg x 2, Trazodone 50mg x 3 from 23 to 23). RESOLVED. Alert and attentive now.     Psychosis unspecified with auditory hallucinations when he abuses substances. RESOLVED.     Opioid withdrawal syndrome- DONE with acute withdrawal. It has been 11 days since his last exposure.     Polysubstance use disorder. He recalls using cocaine and heroin and crystal meth before he presented to ED on 24. Extensive hx of using LSD, psilocybin, MDMA, and ketamine in the past.     Mood disorder unspecified. NO hopelessness or suicidal ideation at this time. Rule out MDD. Rule out Bipolar disorder. Rule out schizophrenia. Rule out PTSD.     Pertinent Medical Diagnoses:  Hx asthma.    Recommendations:  1) Continue Tegretol XL 200mg po twice a day to help with mood.    2) Continue Atarax 50mg po twice a day PRN anxiety and 50mg-100mg nightly PRN insomnia.     3) Tegretol and Atarax scripts printed out. He doesn't have the $92 to fill them at our pharmacy.     4) He is on parole in [place] so unfortunately can NOT go to a residential CD program [place]. He said the [facility] paid for a air flight for him to come to [place]. Skeptical whether this is true.     5) He can be discharged. He will stay with a friend who lives in [place]. He also has information on homeless shelters. His goal is to get the money to fly back to [place] where he lives with his mother if he is telling the truth. Overall, there is a high probability of relapse and being back in  an emergency room.      Ranjit Joaquin MD    Subjective:  1/8/24- He feels tired and has mild anxiety. He denies depressed mood, hopeless or worthless feelings. He has NO suicidal ideation or homicidal ideation. He denies any voices/visions.     He shared how he lives in [place] with his mother. [place] was [place] court in order to resolve a past legal issue. He doesn't have the money for a plane or train ticket back to [place]. He is trying to reach a friend who lives in [place] for help in regards to money and temporary housing. He is open to a 30 day residential CD program for help in staying abstinent from substances. 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 He was recently using heroin and cocaine and meth in Dec 2023.     1/9/24- He kept changing his mind on going to an outpatient vs residential CD program. Discussed how an outpatient program makes no sense since he is homeless. He said he was open to a residential CD program. Then found out he is on parole in [place] so can't go.     He said the [facility] paid for his airplane ticket to [place] and he didn't think about how he was going to get back. He just completed 2.5 yrs in [employer] in [place] for assault and possession of meth. He then came to [place] to resolve some previous legal issues.     Currently, he has mild anxiety. He denies depressed mood, hopeless or worthless feelings, or suicidal ideation. He denies elevated mood, racing thoughts, decreased need for sleep, or grandiose thoughts.    Past Medical History:   Diagnosis Date    Asthma     Depression     Polysubstance abuse (HCC)      History reviewed. No pertinent surgical history.  Family History   Problem Relation Age of Onset    No Known Problems Father     No Known Problems Mother     No Known Problems Maternal Grandmother     No Known Problems Maternal Grandfather     No Known Problems Paternal Grandmother     No Known Problems Paternal  Grandfather       reports that he has been smoking cigarettes. He has a 2.50 pack-year smoking history. He has never used smokeless tobacco. He reports current alcohol use. He reports current drug use. Drugs: \"Crack\" cocaine, benzodiazepines, Cocaine, Heroin, methamphetamine, and Opiods.    Allergies:  No Known Allergies    Medications:    Current Facility-Administered Medications:     loperamide (Imodium) cap 2 mg, 2 mg, Oral, Q4H PRN    ondansetron (Zofran-ODT) disintegrating tab 4 mg, 4 mg, Oral, Q6H PRN **OR** ondansetron (Zofran) 4 MG/2ML injection 4 mg, 4 mg, Intramuscular, Q6H PRN    traZODone (Desyrel) tab 50 mg, 50 mg, Oral, Nightly PRN    dicyclomine (Bentyl) tab 20 mg, 20 mg, Oral, Q4H PRN    hydrOXYzine (Atarax) tab 50 mg, 50 mg, Oral, BID PRN    hydrOXYzine (Atarax) tab 50 mg, 50 mg, Oral, Nightly PRN **OR** hydrOXYzine (Atarax) tab 100 mg, 100 mg, Oral, Nightly PRN    carBAMazepine ER (TEGRETOL XR) 12 hr tab 200 mg, 200 mg, Oral, BID    acetaminophen (Tylenol Extra Strength) tab 1,000 mg, 1,000 mg, Oral, Q4H PRN    melatonin tab 3 mg, 3 mg, Oral, Nightly PRN    polyethylene glycol (PEG 3350) (Miralax) 17 g oral packet 17 g, 17 g, Oral, Daily PRN    sennosides (Senokot) tab 17.2 mg, 17.2 mg, Oral, Nightly PRN    bisacodyl (Dulcolax) 10 MG rectal suppository 10 mg, 10 mg, Rectal, Daily PRN    fleet enema (Fleet) 7-19 GM/118ML rectal enema 133 mL, 1 enema, Rectal, Once PRN    benzonatate (Tessalon) cap 200 mg, 200 mg, Oral, TID PRN    enoxaparin (Lovenox) 40 MG/0.4ML SUBQ injection 40 mg, 40 mg, Subcutaneous, Daily    prochlorperazine (Compazine) 10 MG/2ML injection 5 mg, 5 mg, Intravenous, Q8H PRN    sodium chloride (Saline Mist) 0.65 % nasal solution 1 spray, 1 spray, Each Nare, Q3H PRN    glycerin-hypromellose- (Artifical Tears) 0.2-0.2-1 % ophthalmic solution 1 drop, 1 drop, Both Eyes, QID PRN    haloperidol lactate (Haldol) 5 MG/ML injection 2 mg, 2 mg, Intravenous, Q4H PRN    thiamine  (Vitamin B1) tab 100 mg, 100 mg, Oral, Daily    multivitamin with minerals (Thera M Plus) tab 1 tablet, 1 tablet, Oral, Daily    folic acid (Folvite) tab 1 mg, 1 mg, Oral, Daily    Review of Systems   Constitutional:  Positive for malaise/fatigue.   Psychiatric/Behavioral:  Positive for substance abuse. Negative for depression, hallucinations and suicidal ideas. The patient is nervous/anxious.      Mental Status Exam:     Objective      Vitals:    01/09/24 0830   BP: 104/67   Pulse: 83   Resp: 20   Temp: 97.9 °F (36.6 °C)     Appearance: normal grooming  Behavior: normal psychomotor, cooperative  Gait: Normal    Speech: normal rate, rhythm and volume    Mood: anxious  Affect: Congruent    Thought process: logical and sequential  Thought content: no delusions  Perceptions: no hallucinations  Associations: Intact    Orientation: oriented person, place and oriented situation  Attention and Concentration: fair  Memory:  intact recent and intact remote  Language: Intact naming and repetition  Fund of Knowledge: Able to recite name of [place] president    Insight: limited  Judgment: limited

## 2024-01-09 NOTE — PROGRESS NOTES
[facility]     Hospitalist Progress Note     Gonzales Villafuerte Patient Status:  Inpatient    1990 MRN [id number]   [facility] 3NE-A Attending Mikael Nash MD   Hosp Day # 2 PCP None Pcp     Chief Complaint:   Chief Complaint   Patient presents with    Altered Mental Status     AMS       Subjective:     Doing ok today    Objective:    Review of Systems:   4 point ROS completed and was negative, except for pertinent positive and negatives stated in subjective.    Vital signs:  Temp:  [98.2 °F (36.8 °C)-98.4 °F (36.9 °C)] 98.3 °F (36.8 °C)  Pulse:  [] 94  Resp:  [18-19] 18  BP: (103-114)/(58-72) 114/71  SpO2:  [95 %-97 %] 97 %    Physical Exam:    General: No acute distress.   Respiratory: Clear to auscultation bilaterally. No wheezes. No rhonchi.  Cardiovascular: S1, S2. Regular rate and rhythm. No murmurs.  Abdomen: Soft, nontender, nondistended.    Extremities: No edema.    Diagnostic Data:    Labs:  Recent Labs   Lab 24  0027 24  0836   WBC 8.0 11.2*   HGB 14.5 12.1*   MCV 78.0* 79.2*   .0 170.0       Recent Labs   Lab 24  0027 24  0836   * 95   BUN 20 20   CREATSERUM 1.38* 1.09   CA 9.4 8.6   ALB 3.9 3.4    137   K 3.9 3.7    105   CO2 33.0* 29.0   ALKPHO 108 62   AST 73* 46*   * 125*   BILT 0.3 0.6   TP 8.0 6.5       Estimated Creatinine Clearance: 87 mL/min (based on SCr of 1.09 mg/dL).    No results for input(s): \"PTP\", \"INR\" in the last 168 hours.         COVID-19 Lab Results    COVID-19  Lab Results   Component Value Date    COVID19 Not Detected 2024    COVID19 Not Detected 2023       Pro-Calcitonin  No results for input(s): \"PCT\" in the last 168 hours.    Cardiac  No results for input(s): \"TROP\", \"PBNP\" in the last 168 hours.    Creatinine Kinase  No results for input(s): \"CK\" in the last 168 hours.    Inflammatory Markers  No results for input(s): \"CRP\", \"MAAME\", \"LDH\", \"DDIMER\" in the last 168 hours.    No  results for input(s): \"TROP\", \"TROPHS\", \"CK\" in the last 168 hours.    Imaging: Imaging data reviewed in Epic.    Medications:    carBAMazepine ER  200 mg Oral BID    enoxaparin  40 mg Subcutaneous Daily    thiamine  100 mg Oral Daily    multivitamin with minerals  1 tablet Oral Daily    folic acid  1 mg Oral Daily       Assessment & Plan:      #agitation, hallucination; delirium; multifactorial from drug/etoh/bipolar illness with psychosis - per psych regarding psych meds    #SI  -sitter  -psych     #asthma  #depression      Plan of care discussed with patient and RN    Mikael Gurrola MD    Supplementary Documentation:     Quality:  DVT Prophylaxis: lovenox  CODE status: full  [place]: no  Central line: no      Estimated date of discharge: 1 day?  At this point Mr. Villafuerte is expected to be discharge to: d

## 2024-01-09 NOTE — DISCHARGE PLANNING
NURSING DISCHARGE NOTE    Discharged Home via Ambulatory.  Accompanied by RN  Belongings Taken by patient/family.      Discharge order in place. Pt was cleared for discharge by hospitalist and psychiatrist. Discharge education, medication, and follow-up appointments reviewed. IV removed. All questions answered at the moment of discharge. Pt was given two prescription scripts. Pt was going to catch a flight to [place].

## 2024-01-09 NOTE — PLAN OF CARE
Assumed care at 1930  A&Ox4, RA, VSS.   SR on tele.   Scheduled medications given.   Patient reported feelings of withdrawal with reports of feeling hot and cold flushes, aches joints, and nausea, and requested for Subutex.    Notified Dr. yNe, per Md, Subutex is stopped per Dr. Joaquin's note, and patient is done with opoid  withdrawal days.   Updated patient, and he verbalizes understanding.   Prn atarax given for anxiety.   Plan for discharge to CD program  Staff rounding in place for patient's needs.           Problem: Delirium  Goal: Minimize duration of delirium  Description: Interventions:  - Encourage use of hearing aids, eye glasses  - Promote highest level of mobility daily  - Provide frequent reorientation  - Promote wakefulness i.e. lights on, blinds open  - Promote sleep, encourage patient's normal rest cycle i.e. lights off, TV off, minimize noise and interruptions  - Encourage family to assist in orientation and promotion of home routines  1/8/2024 2250 by Kalpana Holly RN  Outcome: Progressing  1/8/2024 2250 by Kalpana Holly RN  Outcome: Progressing     Problem: Patient/Family Goals  Goal: Patient/Family Long Term Goal  Description: Patient's Long Term Goal: Discharge to rehab for treatment    Interventions:  - Follow up with discharge plan recommendations  - See additional Care Plan goals for specific interventions  1/8/2024 2250 by Kalpana Holly RN  Outcome: Progressing  1/8/2024 2250 by Kalpana Holly RN  Outcome: Progressing  Goal: Patient/Family Short Term Goal  Description: Patient's Short Term Goal: Remain free from withdrawal symptoms    Interventions:   - Maintain stable vitals  - Report any withdrawal symptoms.   - See additional Care Plan goals for specific interventions  1/8/2024 2250 by Kalpana Holly RN  Outcome: Progressing  1/8/2024 2250 by Kalpana Holly RN  Outcome: Progressing     - See additional Care Plan goals for specific interventions  Outcome: Progressing

## 2024-01-09 NOTE — PROGRESS NOTES
Patient seen and examined.  Discharge if ok with consultants.  Hospitalist portion of med rec completed.    Mikael Nash MD  [facility]  Internal Medicine Hospitalist

## 2024-01-09 NOTE — PLAN OF CARE
Assumed pt care this morning at 0730.   Pt is A&OX4.   HUGO, ROSHAN  Tele: NSR  Pt states he is experiencing withdrawal symptoms, no visible tremor or sweat. Psychiatrist notified no new orders.   Plan is for pt to be discharged today. Pt is trying to get a hold of his brother.   Regular diet tolerating well.   R AC IV saline lock.   Up ad emma.   Bed in lowest position, call light within reach.       Problem: Delirium  Goal: Minimize duration of delirium  Description: Interventions:  - Encourage use of hearing aids, eye glasses  - Promote highest level of mobility daily  - Provide frequent reorientation  - Promote wakefulness i.e. lights on, blinds open  - Promote sleep, encourage patient's normal rest cycle i.e. lights off, TV off, minimize noise and interruptions  - Encourage family to assist in orientation and promotion of home routines  Outcome: Progressing     Problem: Patient/Family Goals  Goal: Patient/Family Long Term Goal  Description: Patient's Long Term Goal: Discharge to rehab for treatment    Interventions:  - Follow up with discharge plan recommendations  - See additional Care Plan goals for specific interventions  Outcome: Progressing  Goal: Patient/Family Short Term Goal  Description: Patient's Short Term Goal: Remain free from withdrawal symptoms    Interventions:   - Maintain stable vitals  - Report any withdrawal symptoms.   - See additional Care Plan goals for specific interventions  Outcome: Progressing

## 2024-01-09 NOTE — PLAN OF CARE
Spoke with CD . Residential CD treatment is recommended. He is NOT in any withdrawal at this time, regardless of what he says and thinks. He is Day 11 since his last exposure to substances. An outpatient program will not be helpful because he is homeless at this time.     Dr. Ranjit Joaquin

## 2024-01-09 NOTE — PLAN OF CARE
He is on parole in [place]. He can NOT go to a residential CD program in [place]. He is trying to reach his brother who lives in [place] and see if he can stay with him until he gets the money to get back to [place]. If he can't stay with his brother, he will go to a shelter. He has been to [facility] many times before but said he had a \"bad experience\" there and would go to a different shelter instead. He can be discharged today.     Dr. Ranjit Joaquin

## 2024-01-09 NOTE — CM/SW NOTE
01/09/24 1100   CM/SW Referral Data   Referral Source Nurse   Reason for Referral Discharge planning   Informant Patient;Clinical Staff Member;EMR   Patient Info   Patient's Current Mental Status at Time of Assessment Oriented;Alert   Patient's Home Environment House   Patient lives with Parent(s)   Patient Status Prior to Admission   Independent with ADLs and Mobility Yes   Discharge Needs   Anticipated D/C needs Homeless shelter     Pt is a 32 y/o male admitted with altered mental status. SW received order for discharge planning. Chart reviewed and spoke with Psychiatry MD. Psych MD stated pt is from [place] where he resides with his mother, and he flew out to [place] for a court hearing. Psych MD stated pt is currently homeless and is in need of resources. Psych MD also stated pt is on parole in [place] and is unable to go to a Residential CD program in [place]. Noted DC order placed. KAMAR met with pt at bedside.     Pt stated he resides in [place] with his mother, but flew to [place] to attend a court hearing. Pt stated the state of [place] paid for pt to fly to [place], but the state did not cover the cost of transportation back to [place]. Pt stated he has no money and is reaching out to his brother, Germain, for assistance. KAMAR inquired if pt is able to stay with his brother at discharge if he is local. Pt stated staying with his brother is not an option and did not elaborate. Pt also stated he has been to [facility] in the past but he will not be going there at discharge, also did not elaborate further. Pt's room phone rang while SW was present and pt began talking to his brother, Germain, via phone. Pt stated his brother will pick him up this afternoon and asked if his 'check out time' can be at 4pm. KAMAR informed pt this writer will discuss discharge time with RN. Pt asked his brother via phone to bring him a razor stating 'I don't even know what I look like right now' and motioned to his facial hair with his hand. KAMAR provided pt with  homeless shelter resources and encouraged him to call homeless shelters for availability. Pt thanked SW and resumed talking with his brother via room phone.     SW updated RN and Psych MD of pt's request to his brother for a razor.     Homeless shelter and [facility] resources placed on AVS. SW will continue to remain available.     ARTHUR Zavaleta  Discharge Planner

## 2024-01-10 ENCOUNTER — PATIENT OUTREACH (OUTPATIENT)
Dept: CASE MANAGEMENT | Age: 34
End: 2024-01-10

## 2024-01-10 NOTE — PROGRESS NOTES
NCM attempted to contact patient for HFU, no answer, call continues to ring, no VM, unable to leave message. NCM will try calling back at a later time.

## 2024-01-11 NOTE — PROGRESS NOTES
Hospital follow up.    TCC request.    No answer, left a voicemail with callback information.    Closing encounter.